# Patient Record
(demographics unavailable — no encounter records)

---

## 2025-07-03 NOTE — HISTORY OF PRESENT ILLNESS
[FreeTextEntry1] : NP keloid [de-identified] : 07/01/2025 02:08 PM MEAGHAN GARCIA is a 10 year M presenting today for evaluation of the following:  # keloid on left knee - fell and scraped knee x2 mo ago - scar is now itchy and tender  Here with dad

## 2025-07-03 NOTE — ASSESSMENT
[FreeTextEntry1] : # Keloid, left knee -DIsc various treatment options Procedure: Intralesional triamcinolone Injection Solution: 10 mg/mL Kenalog Total volume injected: 0.3 mL Number of injection sites: 1 Lot #: [id number] Expiration date: 8/2027 Description of Procedure: The treatment location was broadly cleansed with alcohol antiseptic and allowed to dry. The scar was injected with a 30 gauge 1/2 inch needle and infiltrated in the dermal plane with Kenalog. The total volume of Kenalog injected is noted above and the total number of injection sites is also noted. A sterile bandage was placed over the treatment area and follow-up arrangements were made. The patient tolerated the procedure well.  RTC 4-6 week

## 2025-07-03 NOTE — HISTORY OF PRESENT ILLNESS
[FreeTextEntry1] : NP keloid [de-identified] : 07/01/2025 02:08 PM MEAGHAN GARCIA is a 10 year M presenting today for evaluation of the following:  # keloid on left knee - fell and scraped knee x2 mo ago - scar is now itchy and tender  Here with dad